# Patient Record
Sex: MALE | Race: WHITE | NOT HISPANIC OR LATINO | Employment: OTHER | URBAN - METROPOLITAN AREA
[De-identification: names, ages, dates, MRNs, and addresses within clinical notes are randomized per-mention and may not be internally consistent; named-entity substitution may affect disease eponyms.]

---

## 2017-08-10 ENCOUNTER — OFFICE VISIT (OUTPATIENT)
Dept: URGENT CARE | Facility: CLINIC | Age: 42
End: 2017-08-10
Payer: COMMERCIAL

## 2017-08-10 VITALS
BODY MASS INDEX: 27.09 KG/M2 | TEMPERATURE: 98 F | HEIGHT: 72 IN | DIASTOLIC BLOOD PRESSURE: 73 MMHG | SYSTOLIC BLOOD PRESSURE: 120 MMHG | WEIGHT: 200 LBS | HEART RATE: 73 BPM | OXYGEN SATURATION: 100 % | RESPIRATION RATE: 18 BRPM

## 2017-08-10 DIAGNOSIS — L03.811 ABSCESS OR CELLULITIS OF SCALP: Primary | ICD-10-CM

## 2017-08-10 PROCEDURE — 99214 OFFICE O/P EST MOD 30 MIN: CPT | Mod: S$GLB,,, | Performed by: FAMILY MEDICINE

## 2017-08-10 PROCEDURE — 3008F BODY MASS INDEX DOCD: CPT | Mod: S$GLB,,, | Performed by: FAMILY MEDICINE

## 2017-08-10 RX ORDER — SULFAMETHOXAZOLE AND TRIMETHOPRIM 400; 80 MG/1; MG/1
1 TABLET ORAL 2 TIMES DAILY
Qty: 20 TABLET | Refills: 0 | Status: SHIPPED | OUTPATIENT
Start: 2017-08-10 | End: 2017-08-20

## 2017-08-10 RX ORDER — MUPIROCIN 20 MG/G
OINTMENT TOPICAL
Qty: 22 G | Refills: 1 | Status: SHIPPED | OUTPATIENT
Start: 2017-08-10 | End: 2017-11-22

## 2017-08-10 NOTE — PATIENT INSTRUCTIONS
Cellulitis  Cellulitis is an infection of the deep layers of skin. A break in the skin, such as a cut or scratch, can let bacteria under the skin. If the bacteria get to deep layers of the skin, it can be serious. If not treated, cellulitis can get into the bloodstream and lymph nodes. The infection can then spread throughout the body. This causes serious illness.  Cellulitis causes the affected skin to become red, swollen, warm, and sore. The reddened areas have a visible border. An open sore may leak fluid (pus). You may have a fever, chills, and pain.  Cellulitis is treated with antibiotics taken for 7 to 10 days. An open sore may be cleaned and covered with cool wet gauze. Symptoms should get better 1 to 2 days after treatment is started. Make sure to take all the antibiotics for the full number of days until they are gone. Keep taking the medicine even if your symptoms go away.  Home care  Follow these tips:  · Limit the use of the part of your body with cellulitis.   · If the infection is on your leg, keep your leg raised while sitting. This will help to reduce swelling.  · Take all of the antibiotic medicine exactly as directed until it is gone. Do not miss any doses, especially during the first 7 days. Dont stop taking the medicine when your symptoms get better.  · Keep the affected area clean and dry.  · Wash your hands with soap and warm water before and after touching your skin. Anyone else who touches your skin should also wash his or her hands. Don't share towels.  Follow-up care  Follow up with your healthcare provider, or as advised. If your infection does not go away on the first antibiotic, your healthcare provider will prescribe a different one.  When to seek medical advice  Call your healthcare provider right away if any of these occur:  · Red areas that spread  · Swelling or pain that gets worse  · Fluid leaking from the skin (pus)  · Fever higher of 100.4º F (38.0º C) or higher after 2 days  on antibiotics  Date Last Reviewed: 9/1/2016  © 1771-7157 [employer]. [address] [place] [address], [place] [address]. All rights reserved. This information is not intended as a substitute for professional medical care. Always follow your healthcare professional's instructions.

## 2017-08-10 NOTE — PROGRESS NOTES
Subjective:       Patient ID: Tonio Adams is a 42 y.o. male.    Vitals:  height is 6' (1.829 m) and weight is 90.7 kg (200 lb). His temperature is 98.4 °F (36.9 °C). His blood pressure is 120/73 and his pulse is 73. His respiration is 18 and oxygen saturation is 100%.     Chief Complaint: Trauma    Patient reports pain in his occiput region - has noted for the past three days. No known history of trauma. No systemic symptoms.       Trauma   The incident occurred at home. The injury mechanism is unknown. The context of the injury is unknown. No protective equipment was used. Head/neck injury location: Top of head. The pain is moderate. It is unknown if a foreign body is present. Pertinent negatives include no abdominal pain, chest pain, headaches, nausea, neck pain, numbness, vomiting or weakness. There have been no prior injuries to these areas. His tetanus status is UTD.     Review of Systems   Constitution: Negative for chills, fever, weakness and malaise/fatigue.   HENT: Negative for headaches, nosebleeds and sore throat.    Eyes: Negative for blurred vision.   Cardiovascular: Negative for chest pain and syncope.   Respiratory: Negative for shortness of breath.    Skin: Negative for rash.   Musculoskeletal: Negative for back pain, joint pain and neck pain.   Gastrointestinal: Negative for abdominal pain, diarrhea, nausea and vomiting.   Genitourinary: Negative for hematuria.   Neurological: Negative for dizziness and numbness.   Psychiatric/Behavioral: The patient is not nervous/anxious.        Objective:      Physical Exam   Constitutional: He appears well-developed and well-nourished.   Cardiovascular: Normal rate, regular rhythm and normal heart sounds.    Pulmonary/Chest: Effort normal and breath sounds normal.   Abdominal: Soft.   Skin: There is erythema (erythematous, innudurated small papular lesion occiput region with surrounding erythema).   Vitals reviewed.      Assessment:       1. Abscess or  cellulitis of scalp        Plan:         Abscess or cellulitis of scalp  -     sulfamethoxazole-trimethoprim 400-80mg (BACTRIM) 400-80 mg per tablet; Take 1 tablet by mouth 2 (two) times daily.  Dispense: 20 tablet; Refill: 0  -     mupirocin (BACTROBAN) 2 % ointment; Apply to affected area 3 times daily  Dispense: 22 g; Refill: 1    discussed warm compresses with patient.

## 2017-11-12 ENCOUNTER — OFFICE VISIT (OUTPATIENT)
Dept: URGENT CARE | Facility: CLINIC | Age: 42
End: 2017-11-12
Payer: COMMERCIAL

## 2017-11-12 VITALS
HEART RATE: 75 BPM | BODY MASS INDEX: 27.09 KG/M2 | DIASTOLIC BLOOD PRESSURE: 74 MMHG | WEIGHT: 200 LBS | OXYGEN SATURATION: 98 % | RESPIRATION RATE: 18 BRPM | HEIGHT: 72 IN | TEMPERATURE: 97 F | SYSTOLIC BLOOD PRESSURE: 134 MMHG

## 2017-11-12 DIAGNOSIS — S62.339A CLOSED BOXER'S FRACTURE, INITIAL ENCOUNTER: Primary | ICD-10-CM

## 2017-11-12 PROCEDURE — 99214 OFFICE O/P EST MOD 30 MIN: CPT | Mod: S$GLB,,, | Performed by: FAMILY MEDICINE

## 2017-11-12 RX ORDER — IBUPROFEN 800 MG/1
800 TABLET ORAL EVERY 8 HOURS PRN
Qty: 30 TABLET | Refills: 1 | Status: SHIPPED | OUTPATIENT
Start: 2017-11-12 | End: 2017-11-22

## 2017-11-12 NOTE — PATIENT INSTRUCTIONS
Closed Hand Fracture (Adult)  You have a fracture, or broken bone, in your hand. This may be a small crack or chip in the bone. Or it may be a major break with the broken parts pushed out of place. A closed fracture means that the broken bone has not gone through the skin. A hand fracture is treated with a splint or cast. It usually takes 4 to 6 weeks to heal. Severe injuries may require surgery.     Home care  · Keep your arm elevated to reduce pain and swelling. When sitting or lying down, elevate your arm above the level of your heart. You can do this by placing your arm on a pillow that rests on your chest or on a pillow at your side. This is most important during the first 48 hours after injury.  · Apply an ice pack over the injured area for no more than 15 to 20 minutes. Do this every 1 to 2 hours for the first 24 to 48 hours. Continue with ice packs as needed to ease pain and swelling. To make an ice pack, put ice cubes in a plastic bag that seals at the top. Wrap the bag in a clean, thin towel or cloth. Never put ice or an ice pack directly on the skin. You can place the ice pack inside the sling and directly over the cast or splint. As the ice melts, be careful that the cast or splint doesnt get wet.  · Keep the cast or splint completely dry at all times. Bathe with your cast or splint out of the water, protected with 2 large plastic bags. Place 1 bag outside the other. Tape each bag with duct tape at the top end. If a fiberglass cast or splint gets wet, dry it with a hair dryer on a cool setting.  · You may use over-the-counter pain medicine to control pain, unless another pain medicine was prescribed. If you have chronic liver or kidney disease or ever had a stomach ulcer or GI bleeding, talk with your provider beforeusing these medicines.  Follow-up care  Follow up with your healthcare provider within 1 week, or as advised. This is to be sure the bone is healing properly. If you were given a splint,  it may be changed to a cast at your follow-up visit.  If X-rays were taken, you will be told of any new findings that may affect your care.  When to seek medical advice  Call your healthcare provider right away if any of these occur:  · The plaster cast or splint becomes wet or soft  · The fiberglass cast or splint stays wet for more than 24 hours  · The cast has a bad smell  · The plaster cast or splint becomes loose  · There is increased tightness or pain under the cast or splint  · The fingers on your injured hand become swollen, cold, blue, numb, or tingly  Date Last Reviewed: 12/3/2015  © 4422-5271 [employer]. [address] [place] [address], [place] [address]. All rights reserved. This information is not intended as a substitute for professional medical care. Always follow your healthcare professional's instructions.

## 2017-11-12 NOTE — PROGRESS NOTES
Subjective:       Patient ID: Tonio Adams is a 42 y.o. male.    Vitals:  height is 6' (1.829 m) and weight is 90.7 kg (200 lb). His oral temperature is 97.2 °F (36.2 °C). His blood pressure is 134/74 and his pulse is 75. His respiration is 18 and oxygen saturation is 98%.     Chief Complaint: Trauma (Right Hand Pain)    Patient present today with right hand pain since Friday night. Patient states he was boxing without gloves on when he injured his right hand. Patient states he was taking ibuprofen for the pain with little relief.      Trauma   The incident occurred 2 days ago. Incident location: [facility]. The injury mechanism was a direct blow. Context: Boxing. No protective equipment was used. There is an injury to the right hand (Right Hand). Finger injury location: Right Hand. The pain is mild. It is unlikely that a foreign body is present. Pertinent negatives include no abdominal pain, chest pain, neck pain, numbness or weakness. There have been no prior injuries to these areas. His tetanus status is UTD.     Review of Systems   Constitution: Negative for weakness and malaise/fatigue.   HENT: Negative for nosebleeds.    Cardiovascular: Negative for chest pain and syncope.   Respiratory: Negative for shortness of breath.    Musculoskeletal: Positive for joint pain. Negative for back pain and neck pain.   Gastrointestinal: Negative for abdominal pain.   Genitourinary: Negative for hematuria.   Neurological: Negative for dizziness and numbness.       Objective:      Physical Exam   Constitutional: He appears well-developed and well-nourished.   Musculoskeletal: He exhibits tenderness (distal right fifth metacarpal with minimal swelling noted).   Nursing note reviewed.      Assessment:       1. Closed boxer's fracture, initial encounter        Plan:         Closed boxer's fracture, initial encounter  -     Cancel: X-Ray Hand 2 View Right; Future; Expected date: 11/12/2017  -     Ambulatory referral to  Orthopedics  -     ibuprofen (ADVIL,MOTRIN) 800 MG tablet; Take 1 tablet (800 mg total) by mouth every 8 (eight) hours as needed.  Dispense: 30 tablet; Refill: 1    Given information on purchase of ulnar gutter splint

## 2017-11-22 ENCOUNTER — OFFICE VISIT (OUTPATIENT)
Dept: ORTHOPEDICS | Facility: CLINIC | Age: 42
End: 2017-11-22
Payer: COMMERCIAL

## 2017-11-22 ENCOUNTER — TELEPHONE (OUTPATIENT)
Dept: ORTHOPEDICS | Facility: CLINIC | Age: 42
End: 2017-11-22

## 2017-11-22 ENCOUNTER — HOSPITAL ENCOUNTER (OUTPATIENT)
Dept: RADIOLOGY | Facility: OTHER | Age: 42
Discharge: HOME | End: 2017-11-22
Attending: PHYSICIAN ASSISTANT
Payer: COMMERCIAL

## 2017-11-22 VITALS
WEIGHT: 200 LBS | HEART RATE: 64 BPM | RESPIRATION RATE: 18 BRPM | SYSTOLIC BLOOD PRESSURE: 138 MMHG | DIASTOLIC BLOOD PRESSURE: 87 MMHG | BODY MASS INDEX: 27.09 KG/M2 | HEIGHT: 72 IN

## 2017-11-22 DIAGNOSIS — S62.339A CLOSED BOXER'S FRACTURE, INITIAL ENCOUNTER: Primary | ICD-10-CM

## 2017-11-22 DIAGNOSIS — M79.641 RIGHT HAND PAIN: ICD-10-CM

## 2017-11-22 DIAGNOSIS — M79.641 RIGHT HAND PAIN: Primary | ICD-10-CM

## 2017-11-22 PROCEDURE — 73130 X-RAY EXAM OF HAND: CPT | Mod: TC,RT

## 2017-11-22 PROCEDURE — 99213 OFFICE O/P EST LOW 20 MIN: CPT | Mod: S$GLB,,, | Performed by: PHYSICIAN ASSISTANT

## 2017-11-22 PROCEDURE — 99999 PR PBB SHADOW E&M-EST. PATIENT-LVL III: CPT | Mod: PBBFAC,,, | Performed by: PHYSICIAN ASSISTANT

## 2017-11-22 PROCEDURE — 73130 X-RAY EXAM OF HAND: CPT | Mod: 26,RT,, | Performed by: RADIOLOGY
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
## 2017-11-22 NOTE — TELEPHONE ENCOUNTER
----- Message from Zelalem Kaur sent at 11/22/2017 10:51 AM CST -----  Contact: patient  FST Request    x_ 1st Request   _ 2nd Request   _ 3rd Request     Who: STEPHEN SEYMOUR [[id number]]    Why: Patient called is requesting to be seen today for right hand fracture.  Patient was seen in  on 11/12/17 see chart notes.    What Number to Call Back: [phone]    When to Expect a call back: (Before the end of the day)   -- if call after 3:00 call back will be tomorrow.

## 2017-12-06 ENCOUNTER — HOSPITAL ENCOUNTER (OUTPATIENT)
Dept: RADIOLOGY | Facility: OTHER | Age: 42
Discharge: HOME | End: 2017-12-06
Attending: PHYSICIAN ASSISTANT
Payer: COMMERCIAL

## 2017-12-06 ENCOUNTER — OFFICE VISIT (OUTPATIENT)
Dept: ORTHOPEDICS | Facility: CLINIC | Age: 42
End: 2017-12-06
Payer: COMMERCIAL

## 2017-12-06 VITALS
SYSTOLIC BLOOD PRESSURE: 135 MMHG | WEIGHT: 200 LBS | DIASTOLIC BLOOD PRESSURE: 87 MMHG | HEIGHT: 72 IN | BODY MASS INDEX: 27.09 KG/M2 | HEART RATE: 83 BPM | RESPIRATION RATE: 18 BRPM

## 2017-12-06 DIAGNOSIS — S62.339A CLOSED BOXER'S FRACTURE, INITIAL ENCOUNTER: Primary | ICD-10-CM

## 2017-12-06 DIAGNOSIS — S62.339A CLOSED BOXER'S FRACTURE, INITIAL ENCOUNTER: ICD-10-CM

## 2017-12-06 PROCEDURE — 73130 X-RAY EXAM OF HAND: CPT | Mod: TC,RT

## 2017-12-06 PROCEDURE — 99999 PR PBB SHADOW E&M-EST. PATIENT-LVL IV: CPT | Mod: PBBFAC,,, | Performed by: PHYSICIAN ASSISTANT

## 2017-12-06 PROCEDURE — 99213 OFFICE O/P EST LOW 20 MIN: CPT | Mod: S$GLB,,, | Performed by: PHYSICIAN ASSISTANT

## 2017-12-06 PROCEDURE — 73130 X-RAY EXAM OF HAND: CPT | Mod: 26,RT,, | Performed by: RADIOLOGY
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
## 2017-12-14 ENCOUNTER — CLINICAL SUPPORT (OUTPATIENT)
Dept: REHABILITATION | Facility: HOSPITAL | Age: 42
End: 2017-12-14
Attending: PHYSICIAN ASSISTANT
Payer: COMMERCIAL

## 2017-12-14 DIAGNOSIS — S62.339A CLOSED BOXER'S FRACTURE, INITIAL ENCOUNTER: Primary | Chronic | ICD-10-CM

## 2017-12-14 PROCEDURE — 97018 PARAFFIN BATH THERAPY: CPT

## 2017-12-14 PROCEDURE — 97110 THERAPEUTIC EXERCISES: CPT

## 2017-12-14 PROCEDURE — 97165 OT EVAL LOW COMPLEX 30 MIN: CPT
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
## 2017-12-14 NOTE — PATIENT INSTRUCTIONS
[facility]  OCCUPATIONAL THERAPY  HOME EXERCISE PROGRAM     Complete the following massage for 3 minutes, 3x/day:           A. Enclose tip of finger with other hand and slide toward wrist.  B. For larger areas, massage toward the body in one direction only.  .  Complete the following exercises for 10 repetitions, 3x/day:                                                                   ASHLEY Sheehan LOTR, MARCYT  Certified Hand Therapist  Occupational Therapist

## 2017-12-20 NOTE — PROGRESS NOTES
"Occupational Therapy Daily Note     Date: 12/21/2017  Name: Tonio Adams  YOB: 1975  Chart Number: [id number]  Referring Physician: Eneida Tafoya PA and Dr. Corbett  Diagnosis:   1. Closed boxer's fracture, initial encounter       ICD 10: S62.339A  Involved Side: Right   Hand Dominance: Right  Date of Onset: 11/10/2017  Date of OT Evaluation: 12/14/2017    Visit #: 2    Subjective   "I have been doing my home program and my motion is a lot better."  Pain Report (severity and location)  Current: 0 out of 10  With activity: 1 out of 10     Objective   Edema. Measured in centimeters.    12/14/2017 12/14/2017       Ring:             P1            6.8 7.0        PIP            6.2 7.1       P2             5.4 5.8       Small:              P1           5.7 6.5         PIP        5.4 5.8       P2        5.0 5.0          Wrist ROM. Measured in degrees.  Date 12/14/2017 12/14/2017         Left Right       Wrist ext/flex 73/77 53/48            Hand ROM. Measured in degrees.  Date 12/14/2017 12/14/2017         Left Right       Ring:   MP 0/87 0/42                  PIP 0/108 8/48                  DIP 0/78 0/15                  FRY   97                   Small:  MP 0/95 0/14                   PIP 0/94 10/30                   DIP 0/80 0/10                  FRY   44              Strength (Dyanmometer) and Pinch Strength (Pinch Gauge)  Measured in pounds.  Date 12/14/2017 12/14/2017         Left Right         deferred deferred       Rung II           Rung III           Ulrich Pinch           3pt Pinch           2pt Pinch                 Functional Limitation Reporting   Tool: FOTO  Category: Carry, Move, Handle  Visit DATE SCORE Current  G-Code Goal  G-Code   Intake 12/14/2017 57/100 -JX -DM   5TH           10TH           Discharge              Treatment      IPJ Blocks FDS Glides Wave Hook Straight Fist Composite Fist   RF x10  SF x10  x10 AROM x10 AROM x10 AROM x10 AROM     Lifts ADD/ABD " "Reverse  Blocks Isolated   EDM Isolated   EDC   x10 AROM x10 AROM Not today x10 AROM Not today     Isospheres Octy Pom Poms In Hand Manip Dowel   Grabs    x3min Not today SF scoops x 1 container Not today Lg/md/sm x20ea      Reviewed HEP with patient to ensure proper performance of therex.     Charges   Start Time: 2:15PM  End Time: 3:00PM  Total Time: 45min  Moist Hot Pack - 55930    Fluidotherapy - 75788    Ultrasound - 10113    Paraffin - 97018 X1, 15min    Therapeutic Exercise - 97110 X2, 30min   Therapeutic Activity - 96723    Manual Therapy - 53725      Assessment   Patient is a 41yo male with a right boxers fracture being managed conservatively. Edema, decreased endurance, limited ROM, and decreased strength are limiting functional use of R, dominant hand.    Plan   Continue skilled occupational therapy 1-2x/week for 12 weeks from 12/14/2017 to 3/14/2017.    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  LTG #1: Pt will report a pain level of 0 out of 10 with using tools.       LTG #2: Pt will demo improved FOTO score to 80% or higher.    LTG #3: Pt will return to prior level of function for ADLs and household management.      Short Term Goals (STGs); to be met within 4 weeks (1/14/2018).  STG #1a: Pt will report 2 out of 10 pain level with bathing.  STG #2a: Pt will demo improved FOTO score by 15 points.   STG #3a: Pt will demonstrate independence with issued HEP. MET  STG #3b: Pt will demo improved RF and SF FRY by 20 degrees each needed to aid with closing his hand around objects.    Adjustments/Upgrades to HEP None today     Therapist: Eneida Price, ASHLEY, BLESSING, CHT     "

## 2017-12-21 ENCOUNTER — CLINICAL SUPPORT (OUTPATIENT)
Dept: REHABILITATION | Facility: HOSPITAL | Age: 42
End: 2017-12-21
Payer: COMMERCIAL

## 2017-12-21 DIAGNOSIS — S62.339A CLOSED BOXER'S FRACTURE, INITIAL ENCOUNTER: Primary | Chronic | ICD-10-CM

## 2017-12-21 PROCEDURE — 97110 THERAPEUTIC EXERCISES: CPT

## 2017-12-21 PROCEDURE — 97018 PARAFFIN BATH THERAPY: CPT

## 2017-12-27 NOTE — PROGRESS NOTES
Occupational Therapy Daily Note     Date: 12/28/2017  Name: Tonio Adams  YOB: 1975  Chart Number: [id number]  Referring Physician: Eneida Tafoya PA and Dr. Corbett  Diagnosis:   1. Closed boxer's fracture, initial encounter       ICD 10: S62.339A  Involved Side: Right   Hand Dominance: Right  Date of Onset: 11/10/2017  Date of OT Evaluation: 12/14/2017    Visit #: 3    Subjective   Pt reported continued compliance with HEP.     Objective   Edema. Measured in centimeters.    12/14/2017 12/14/2017       Ring:             P1            6.8 7.0        PIP            6.2 7.1       P2             5.4 5.8       Small:              P1           5.7 6.5         PIP        5.4 5.8       P2        5.0 5.0          Wrist ROM. Measured in degrees.  Date 12/14/2017 12/14/2017        Left Right      Wrist ext/flex 73/77 53/48            Hand ROM. Measured in degrees.  Date 12/14/2017 12/14/2017        Left Right      Ring:   MP 0/87 0/42                  PIP 0/108 8/48                  DIP 0/78 0/15                  FRY   97                   Small:  MP 0/95 0/14                   PIP 0/94 10/30                   DIP 0/80 0/10                  FYR   44              Strength (Dyanmometer) and Pinch Strength (Pinch Gauge)  Measured in pounds.  Date 12/14/2017 12/14/2017         Left Right         deferred deferred       Rung II           Rung III           Ulrich Pinch           3pt Pinch           2pt Pinch                 Functional Limitation Reporting   Tool: FOTO  Category: Carry, Move, Handle  Visit DATE SCORE Current  G-Code Goal  G-Code   Intake 12/14/2017 57/100 -ZE -AS   5TH           10TH           Discharge              Treatment      IPJ Blocks FDS Glides Wave Hook Straight Fist Composite Fist   RF x10  SF x10  x10 AROM x10 AROM x10 AROM x10 AROM     Lifts ADD/ABD Reverse  Blocks Isolated   EDM Isolated   EDC   x10 AROM x10 AROM Not today x10 AROM Not today     Isospheres Octy Pom Poms In  "Hand Manip Dowel   Grabs Rice  Transfers   x3min Not today SF scoops x 1 container Not today Lg/md/sm x20ea x3min     Reviewed HEP with patient to ensure proper performance of therex.     Charges   Start Time: 2:30  End Time: 3:15  Total Time: 45min  Moist Hot Pack - 73063    Fluidotherapy - 42851    Ultrasound - 03748    Paraffin - 97018 X1, 15min    Therapeutic Exercise - 97110 X2, 30min   Therapeutic Activity - 34737    Manual Therapy - 33555      Assessment   Patient is a 43yo male with a right boxers fracture being managed conservatively. Edema, decreased endurance, limited ROM, and decreased strength are limiting functional use of R, dominant hand. Pt to return to Md.     Plan   Continue skilled occupational therapy 1-2x/week for 12 weeks from 12/14/2017 to 3/14/2017.    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  LTG #1: Pt will report a pain level of 0 out of 10 with using tools.       LTG #2: Pt will demo improved FOTO score to 80% or higher.    LTG #3: Pt will return to prior level of function for ADLs and household management.      Short Term Goals (STGs); to be met within 4 weeks (1/14/2018).  STG #1a: Pt will report 2 out of 10 pain level with bathing.  STG #2a: Pt will demo improved FOTO score by 15 points.   STG #3a: Pt will demonstrate independence with issued HEP. MET  STG #3b: Pt will demo improved RF and SF FRY by 20 degrees each needed to aid with closing his hand around objects.    Adjustments/Upgrades to HEP None today     Therapist: Eneida Price, ASHLEY, BLESSING, CHT     "

## 2017-12-28 ENCOUNTER — CLINICAL SUPPORT (OUTPATIENT)
Dept: REHABILITATION | Facility: HOSPITAL | Age: 42
End: 2017-12-28
Payer: COMMERCIAL

## 2017-12-28 DIAGNOSIS — S62.339A CLOSED BOXER'S FRACTURE, INITIAL ENCOUNTER: Primary | Chronic | ICD-10-CM

## 2017-12-28 PROCEDURE — 97018 PARAFFIN BATH THERAPY: CPT

## 2017-12-28 PROCEDURE — 97110 THERAPEUTIC EXERCISES: CPT

## 2018-01-03 ENCOUNTER — OFFICE VISIT (OUTPATIENT)
Dept: ORTHOPEDICS | Facility: CLINIC | Age: 43
End: 2018-01-03
Payer: COMMERCIAL

## 2018-01-03 ENCOUNTER — HOSPITAL ENCOUNTER (OUTPATIENT)
Dept: RADIOLOGY | Facility: OTHER | Age: 43
Discharge: HOME | End: 2018-01-03
Attending: PHYSICIAN ASSISTANT
Payer: COMMERCIAL

## 2018-01-03 VITALS
HEIGHT: 72 IN | WEIGHT: 200 LBS | HEART RATE: 80 BPM | BODY MASS INDEX: 27.09 KG/M2 | DIASTOLIC BLOOD PRESSURE: 92 MMHG | SYSTOLIC BLOOD PRESSURE: 149 MMHG | RESPIRATION RATE: 18 BRPM

## 2018-01-03 DIAGNOSIS — S62.339A CLOSED BOXER'S FRACTURE, INITIAL ENCOUNTER: Primary | Chronic | ICD-10-CM

## 2018-01-03 DIAGNOSIS — S62.339A CLOSED BOXER'S FRACTURE, INITIAL ENCOUNTER: ICD-10-CM

## 2018-01-03 PROCEDURE — 73130 X-RAY EXAM OF HAND: CPT | Mod: 26,FY,RT, | Performed by: RADIOLOGY

## 2018-01-03 PROCEDURE — 73130 X-RAY EXAM OF HAND: CPT | Mod: TC,FY,RT

## 2018-01-03 PROCEDURE — 99999 PR PBB SHADOW E&M-EST. PATIENT-LVL III: CPT | Mod: PBBFAC,,, | Performed by: PHYSICIAN ASSISTANT

## 2018-01-03 PROCEDURE — 99213 OFFICE O/P EST LOW 20 MIN: CPT | Mod: S$GLB,,, | Performed by: PHYSICIAN ASSISTANT

## 2018-01-03 NOTE — PROGRESS NOTES
Subjective:      Patient ID: Tonio Adams is a 42 y.o. male.    Chief Complaint: Pain of the Right Hand      HPI  Tonio Adams is a right hand dominant 42 y.o. male presenting today for follow up of right 5th metacarpal fracture.  There was a history of trauma.  Onset of symptoms began 8 weeks ago, approximately 11/10/17.  He has been in a TKO brace for the past 8 weeks.  He has started OT and is attending once a week.  He says that the pain has improved.  He reports that he has started some light lifting with the right hand in his daily activities.  He reports improving motion.  He denies any numbness or tingling.      Review of patient's allergies indicates:  No Known Allergies      No current outpatient prescriptions on file.     No current facility-administered medications for this visit.        History reviewed. No pertinent past medical history.    History reviewed. No pertinent surgical history.      Review of Systems:  Review of Systems   Constitution: Negative for chills and fever.   Skin: Negative for rash and suspicious lesions.   Musculoskeletal:        See HPI   Neurological: Negative for dizziness, headaches, light-headedness, numbness and paresthesias.   Psychiatric/Behavioral: Negative for depression. The patient is not nervous/anxious.          OBJECTIVE:     PHYSICAL EXAM:  Height: 6' (182.9 cm) Weight: 90.7 kg (200 lb)     Vitals:    01/03/18 1411   BP: (!) 149/92   Pulse: 80   Resp: 18     General    Vitals reviewed.  Constitutional: He is oriented to person, place, and time. He appears well-developed and well-nourished.   HENT:   Head: Normocephalic and atraumatic.   Neck: Normal range of motion.   Cardiovascular: Normal rate.    Pulmonary/Chest: Effort normal. No respiratory distress.   Neurological: He is alert and oriented to person, place, and time.   Psychiatric: He has a normal mood and affect. His behavior is normal. Judgment and thought content normal.             Musculoskeletal: Mild  edema noted over the ulnar aspect of the right hand including the right small and ring fingers - improving.  No ecchymosis, no cuts or abrasions appreciated.  Decreased motion of the small and ring finger noted on the right. No significant scissoring of the small finger noted, appears symmetrical.  Non-tender to palpation over the fifth metacarpal. Neurovascularly intact - good sensation and motor function, good capillary refill.     RADIOGRAPHS:  Right Hand X-Ray, 12/6/17  FINDINGS:     Fracture: Fracture of the distal 5th metacarpal metadiaphysis with minimal dorsal angulation is in unchanged alignment.  There is associated periosteal new bone formation.   Joint Spaces: Normal.   Soft Tissues: Normal.   Other: N/A   Impression   Healing fracture of the distal 5th metacarpal metadiaphysis with minimal dorsal angulation in unchanged alignment.     Comments: I have personally reviewed the imaging and I agree with the above radiologist's report.    ASSESSMENT/PLAN:   Tonio was seen today for pain.    Diagnoses and all orders for this visit:    Closed boxer's fracture, initial encounter  -     X-Ray Hand 3 View Right; Future           - We talked at length about the anatomy and pathophysiology of   Encounter Diagnosis   Name Primary?    Closed boxer's fracture, initial encounter Yes       - Continue regular OT  - X-Ray reviewed with patient  - Discussed lifting restrictions  - Continue use of TKO brace, wean out in OT  - Follow up in 4 weeks with X-Ray

## 2018-01-04 ENCOUNTER — CLINICAL SUPPORT (OUTPATIENT)
Dept: REHABILITATION | Facility: HOSPITAL | Age: 43
End: 2018-01-04
Payer: COMMERCIAL

## 2018-01-04 PROCEDURE — 97140 MANUAL THERAPY 1/> REGIONS: CPT

## 2018-01-04 PROCEDURE — 97110 THERAPEUTIC EXERCISES: CPT

## 2018-01-04 NOTE — PROGRESS NOTES
"Occupational Therapy Daily Note     Date: 1/4/2018  Name: Tonio Adams  YOB: 1975  Chart Number: [id number]  Referring Physician: Eneida Tafoya PA and Dr. Corbett  Diagnosis:   No diagnosis found.  ICD 10: S62.339A  Involved Side: Right   Hand Dominance: Right  Date of Onset: 11/10/2017  Date of OT Evaluation: 12/14/2017    Visit #: 4    Subjective   "I am ready to get rid of this brace, the doctor said I can come out of it"    Objective   Edema. Measured in centimeters.    12/14/2017 12/14/2017       Ring:             P1            6.8 7.0        PIP            6.2 7.1       P2             5.4 5.8       Small:              P1           5.7 6.5         PIP        5.4 5.8       P2        5.0 5.0          Wrist ROM. Measured in degrees.  Date 12/14/2017 12/14/2017        Left Right      Wrist ext/flex 73/77 53/48            Hand ROM. Measured in degrees.  Date 12/14/2017 12/14/2017        Left Right      Ring:   MP 0/87 0/42                  PIP 0/108 8/48                  DIP 0/78 0/15                  FRY   97                   Small:  MP 0/95 0/14                   PIP 0/94 10/30                   DIP 0/80 0/10                  FRY   44              Strength (Dyanmometer) and Pinch Strength (Pinch Gauge)  Measured in pounds.  Date 12/14/2017 12/14/2017         Left Right         deferred deferred       Rung II           Rung III           Ulrich Pinch           3pt Pinch           2pt Pinch                 Functional Limitation Reporting   Tool: FOTO  Category: Carry, Move, Handle  Visit DATE SCORE Current  G-Code Goal  G-Code   Intake 12/14/2017 57/100 -TC -YG   5TH           10TH           Discharge              Treatment        Retrograde massage  Gentle PROM stretching to pt gerald.   Rubber band finger walking  Gripper white spring #1,2,3 to  cotton balls.  Putty gripping soft yellow to pt gerald.    Pt instructed in use of ice and pain management strategies for possible muscular " "soreness    IPJ Blocks FDS Glides Wave Hook Straight Fist Composite Fist   RF x10  SF x10  x10 AROM x10 AROM x10 AROM x10 AROM     Lifts ADD/ABD Reverse  Blocks Isolated   EDM Isolated   EDC   x10 AROM x10 AROM Not today x10 AROM Not today     Isospheres Octy Pom Poms In Hand Manip Dowel   Grabs Rice  Transfers   x3min Not today SF scoops x 1 container Not today Lg/md/sm x20ea x3min     Reviewed HEP with patient to ensure proper performance of therex.     Charges   Start Time: 2:30  End Time: 3:15  Total Time: 45min  Moist Hot Pack - 20118    Fluidotherapy - 80007    Ultrasound - 29866    Paraffin - 97018 X1, 15min    Therapeutic Exercise - 97110 X2, 30min   Therapeutic Activity - 17645    Manual Therapy - 57352      Assessment   Patient is a 43yo male with a right boxers fracture being managed conservatively. Edema, decreased endurance, limited ROM, and decreased strength are limiting functional use of R, dominant hand. Pt to return to Md. Pt able to  18 lbs with gripper pain free. Digital AROM shows impressive AROM. Pt has little pain. The patient presents with mild edema in digits. Provided coban at end of therapy to be removed in one hour.    Plan   Continue skilled occupational therapy 1-2x/week for 12 weeks from 12/14/2017 to 3/14/2017.    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  LTG #1: Pt will report a pain level of 0 out of 10 with using tools.       LTG #2: Pt will demo improved FOTO score to 80% or higher.    LTG #3: Pt will return to prior level of function for ADLs and household management.      Short Term Goals (STGs); to be met within 4 weeks (1/14/2018).  STG #1a: Pt will report 2 out of 10 pain level with bathing.  STG #2a: Pt will demo improved FOTO score by 15 points.   STG #3a: Pt will demonstrate independence with issued HEP. MET  STG #3b: Pt will demo improved RF and SF FRY by 20 degrees " each needed to aid with closing his hand around objects.    Adjustments/Upgrades to HEP None today     Therapist: ASHLEY Sheehan LOTR, CHT

## 2018-01-09 ENCOUNTER — CLINICAL SUPPORT (OUTPATIENT)
Dept: REHABILITATION | Facility: HOSPITAL | Age: 43
End: 2018-01-09
Attending: PHYSICIAN ASSISTANT
Payer: COMMERCIAL

## 2018-01-09 PROCEDURE — 97110 THERAPEUTIC EXERCISES: CPT

## 2018-01-09 PROCEDURE — 97140 MANUAL THERAPY 1/> REGIONS: CPT

## 2018-01-09 NOTE — PROGRESS NOTES
"Occupational Therapy Daily Note     Date: 1/9/2018  Name: Tonio Adams  YOB: 1975  Chart Number: [id number]  Referring Physician: Eneida Tafoya PA and Dr. Corbett  Diagnosis:   No diagnosis found.   ICD 10: S62.339A  Involved Side: Right   Hand Dominance: Right  Date of Onset: 11/10/2017  Date of OT Evaluation: 12/14/2017    Visit #: 5    Subjective   "it was sore but not terrible"    Objective   Edema. Measured in centimeters.    12/14/2017 12/14/2017       Ring:             P1            6.8 7.0        PIP            6.2 7.1       P2             5.4 5.8       Small:              P1           5.7 6.5         PIP        5.4 5.8       P2        5.0 5.0          Wrist ROM. Measured in degrees.  Date 12/14/2017 12/14/2017        Left Right      Wrist ext/flex 73/77 53/48            Hand ROM. Measured in degrees.  Date 12/14/2017 12/14/2017        Left Right      Ring:   MP 0/87 0/42                  PIP 0/108 8/48                  DIP 0/78 0/15                  FRY   97                   Small:  MP 0/95 0/14                   PIP 0/94 10/30                   DIP 0/80 0/10                  FRY   44              Strength (Dyanmometer) and Pinch Strength (Pinch Gauge)  Measured in pounds.  Date 12/14/2017 12/14/2017         Left Right         deferred deferred       Rung II           Rung III           Ulrich Pinch           3pt Pinch           2pt Pinch                 Functional Limitation Reporting   Tool: FOTO  Category: Carry, Move, Handle  Visit DATE SCORE Current  G-Code Goal  G-Code   Intake 12/14/2017 57/100 -WT -HI   5TH           10TH           Discharge              Treatment        Retrograde massage  Gentle PROM stretching to pt gerald.   Rubber band finger walking  Gripper white spring #1,2,3 to  cotton balls.  Putty gripping soft yellow to pt gerald.    Pt instructed in use of ice and pain management strategies for possible muscular soreness    IPJ Blocks FDS Glides Wave Hook " "Straight Fist Composite Fist   RF x10  SF x10  x10 AROM x10 AROM x10 AROM x10 AROM     Lifts ADD/ABD Reverse  Blocks Isolated   EDM Isolated   EDC   x10 AROM x10 AROM Not today x10 AROM Not today     Isospheres Octy Pom Poms In Hand Manip Dowel   Grabs Rice  Transfers   x3min Not today SF scoops x 1 container Not today Lg/md/sm x20ea x3min     Reviewed HEP with patient to ensure proper performance of therex.     Charges   Start Time: 1:40  End Time: 2:18  Total Time: 45min  Moist Hot Pack - 21900    Fluidotherapy - 06240    Ultrasound - 85344    Paraffin - 46027    Therapeutic Exercise - 97110 X2, 30min   Therapeutic Activity - 73479    Manual Therapy - 97140 x1 8min     Assessment   Patient is a 41yo male with a right boxers fracture being managed conservatively. Edema, decreased endurance, limited ROM, and decreased strength are limiting functional use of R, dominant hand. Pt has little pain. The patient presents with mild edema in digits. Provided coban at end of therapy to be removed in one hour. Pt reported mild muscular soreness following previous session. Pt transitioning well to strengthening program. Pt provided with yellow t-putty for indep strengthening.     Plan   Continue skilled occupational therapy 1-2x/week for 12 weeks from 12/14/2017 to 3/14/2017.    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  LTG #1: Pt will report a pain level of 0 out of 10 with using tools.       LTG #2: Pt will demo improved FOTO score to 80% or higher.    LTG #3: Pt will return to prior level of function for ADLs and household management.      Short Term Goals (STGs); to be met within 4 weeks (1/14/2018).  STG #1a: Pt will report 2 out of 10 pain level with bathing.  STG #2a: Pt will demo improved FOTO score by 15 points.   STG #3a: Pt will demonstrate independence with issued HEP. MET  STG #3b: Pt will demo improved RF and SF " FRY by 20 degrees each needed to aid with closing his hand around objects.    Adjustments/Upgrades to HEP None today     Therapist: ASHLEY Sheehan LOTR, CHT

## 2018-01-11 ENCOUNTER — CLINICAL SUPPORT (OUTPATIENT)
Dept: REHABILITATION | Facility: HOSPITAL | Age: 43
End: 2018-01-11
Attending: PHYSICIAN ASSISTANT
Payer: COMMERCIAL

## 2018-01-11 DIAGNOSIS — S62.339A CLOSED BOXER'S FRACTURE, INITIAL ENCOUNTER: Primary | ICD-10-CM

## 2018-01-11 DIAGNOSIS — R29.898 DECREASED GRIP STRENGTH OF RIGHT HAND: ICD-10-CM

## 2018-01-11 DIAGNOSIS — M25.60 RANGE OF MOTION DEFICIT: ICD-10-CM

## 2018-01-11 PROCEDURE — 97018 PARAFFIN BATH THERAPY: CPT | Mod: 59

## 2018-01-11 PROCEDURE — 97110 THERAPEUTIC EXERCISES: CPT

## 2018-01-11 NOTE — PROGRESS NOTES
"Occupational Therapy Daily Note     Date: 1/11/2018  Name: Tonio Adams  YOB: 1975  Chart Number: [id number]  Referring Physician: Eneida Tafoya PA and Dr. Corbett  Diagnosis:   1. Closed boxer's fracture, initial encounter     2. Range of motion deficit     3. Decreased  strength of right hand        ICD 10: S62.339A  Involved Side: Right   Hand Dominance: Right  Date of Onset: 11/10/2017  Date of OT Evaluation: 12/14/2017    Visit #: 6  (FOTO, visit 1, 6)     Subjective   "I still have some stiffness but I'm doing some wall pushups and 4# wrist curls "     MD visit 2/1/2018     Objective   Edema. Measured in centimeters.    12/14/2017 12/14/2017 1/11/2018      Ring:    left  Right  Right      P1            6.8 7.0 6.6 (-0.4)      PIP            6.2 7.1  6.4 (-0.7)      P2             5.4 5.8  5.4 (-0.4)      Small:              P1           5.7 6.5 6.2 (-0.3 )        PIP        5.4 5.8  5.8      P2        5.0 5.0 5.0         Wrist ROM. Measured in degrees.  Date 12/14/2017 12/14/2017        Left Right      Wrist ext/flex 73/77 53/48            Hand ROM. Measured in degrees.  Date 12/14/2017 12/14/2017 1/11/18       Left Right Right      Ring:   MP 0/87 0/42 0/85                 PIP 0/108 8/48 0/97                 DIP 0/78 0/15  0/70                FRY   97  252                 Small:  MP 0/95 0/14 0/90                  PIP 0/94 10/30 8/93                 DIP 0/80 0/10  0/62                FRY   44  237            Strength (Dyanmometer) and Pinch Strength (Pinch Gauge)  Measured in pounds.  Date 1/11/2018          Right / left                   Rung II  R 45  / L 65          Rung III           Ulrich Pinch           3pt Pinch           2pt Pinch                 Functional Limitation Reporting   Tool: FOTO  Category: Carry, Move, Handle  Visit DATE SCORE Current  G-Code Goal  G-Code   Intake 12/14/2017 57/100 -SV -KC   6TH  1/11/2018  61/100       10TH           Discharge          " "    Treatment        Gentle PROM stretching to pt gerald.     Mixed putty yellow/red given to patient for HEP at last session.  Patient performed  daily at home.     IPJ Blocks FDS Glides Wave Hook Straight Fist Composite Fist   RF x10  SF x10  x10 AROM x10 AROM x10 AROM x10 AROM     Lifts ADD/ABD Reverse  Blocks Isolated   EDM Isolated   EDC   x10 AROM x10 AROM Not today x10 AROM X 10 reps      Added 55 lbs. PHG for gross  x 10 reps, blue digit extender for EDC/EDM strengthening x 15 reps. And green digi flex x 12 reps for isolated/composite finger flexion , 4# wrist flex/ext, RD, UD, sup/pron 2x10 reps.   Reviewed HEP with patient to ensure proper performance of therex.     Charges   Start Time: 2:15   End Time: 3  Total Time: 45min  Moist Hot Pack - 40461    Fluidotherapy - 34278    Ultrasound - 36144    Paraffin - 50829     10 min    Therapeutic Exercise - 97110 X2, 35min   Therapeutic Activity - 55991    Manual Therapy - 60061      Assessment   Patient is a 41yo male with a right boxers fracture being managed conservatively. Edema, decreased endurance, limited ROM, and decreased strength are limiting functional use of R, dominant hand. Pt has little pain. Pt transitioning well to strengthening program.     Plan   Continue skilled occupational therapy 1-2x/week for 12 weeks from 12/14/2017 to 3/14/2017.    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  LTG #1: Pt will report a pain level of 0 out of 10 with using tools.       LTG #2: Pt will demo improved FOTO score to 80% or higher.    LTG #3: Pt will return to prior level of function for ADLs and household management.      Short Term Goals (STGs); to be met within 4 weeks (1/14/2018).  STG #1a: Pt will report 2 out of 10 pain level with bathing.--met   STG #2a: Pt will demo improved FOTO score by 15 points.   STG #3a: Pt will demonstrate independence with issued " HEP. MET  STG #3b: Pt will demo improved RF and SF FRY by 20 degrees each needed to aid with closing his hand around objects.    Adjustments/Upgrades to HEP None today

## 2018-01-16 ENCOUNTER — CLINICAL SUPPORT (OUTPATIENT)
Dept: REHABILITATION | Facility: HOSPITAL | Age: 43
End: 2018-01-16
Attending: PHYSICIAN ASSISTANT
Payer: COMMERCIAL

## 2018-01-16 DIAGNOSIS — M25.60 RANGE OF MOTION DEFICIT: ICD-10-CM

## 2018-01-16 DIAGNOSIS — S62.339A CLOSED BOXER'S FRACTURE, INITIAL ENCOUNTER: Primary | ICD-10-CM

## 2018-01-16 DIAGNOSIS — R29.898 DECREASED GRIP STRENGTH OF RIGHT HAND: ICD-10-CM

## 2018-01-16 PROCEDURE — 97018 PARAFFIN BATH THERAPY: CPT | Mod: 59

## 2018-01-16 PROCEDURE — 97110 THERAPEUTIC EXERCISES: CPT

## 2018-01-16 NOTE — PROGRESS NOTES
"Occupational Therapy Daily Note     Date: 1/16/2018  Name: Tonio Adams  YOB: 1975  Chart Number: [id number]  Referring Physician: Eneida Tafoya PA and Dr. Corbett  Diagnosis:   1. Closed boxer's fracture, initial encounter     2. Range of motion deficit     3. Decreased  strength of right hand        ICD 10: S62.339A  Involved Side: Right   Hand Dominance: Right  Date of Onset: 11/10/2017  Date of OT Evaluation: 12/14/2017    Visit #: 7  (FOTO, visit 1, 6)     Subjective   "I was able to pull my luggage thru [place] airport with no problems and without the brace"      MD visit 2/1/2018     Objective   Edema. Measured in centimeters.    12/14/2017 12/14/2017 1/11/2018      Ring:    left  Right  Right      P1            6.8 7.0 6.6 (-0.4)      PIP            6.2 7.1  6.4 (-0.7)      P2             5.4 5.8  5.4 (-0.4)      Small:              P1           5.7 6.5 6.2 (-0.3 )        PIP        5.4 5.8  5.8      P2        5.0 5.0 5.0         Wrist ROM. Measured in degrees.  Date 12/14/2017 12/14/2017        Left Right      Wrist ext/flex 73/77 53/48            Hand ROM. Measured in degrees.  Date 12/14/2017 12/14/2017 1/11/18       Left Right Right      Ring:   MP 0/87 0/42 0/85                 PIP 0/108 8/48 0/97                 DIP 0/78 0/15  0/70                FRY   97  252                 Small:  MP 0/95 0/14 0/90                  PIP 0/94 10/30 8/93                 DIP 0/80 0/10  0/62                FRY   44  237            Strength (Dyanmometer) and Pinch Strength (Pinch Gauge)  Measured in pounds.  Date 1/11/2018          Right / left                   Rung II  R 45  / L 65          Rung III           Ulirch Pinch           3pt Pinch           2pt Pinch                 Functional Limitation Reporting   Tool: FOTO  Category: Carry, Move, Handle  Visit DATE SCORE Current  G-Code Goal  G-Code   Intake 12/14/2017 57/100 -EI -LJ   6TH  1/11/2018  61/100       10TH           Discharge      " "        Treatment        IPJ Blocks FDS Glides Wave Hook Straight Fist Composite Fist   RF x10  SF x10  x10 AROM x10 AROM x10 AROM x10 AROM     Lifts ADD/ABD Reverse  Blocks Isolated   EDM Isolated   EDC   x10 AROM x10 AROM Not today x10 AROM X 10 reps      Upgraded to green putty in clinic for gross , raking, reverse raking, and digit composite extension "donut" x 10 reps each.   Provided green putty for HEP.    Added 55 lbs. PHG for gross  x 10 reps, blue digit extender for EDC/EDM strengthening x 15 reps. And green digi flex x 12 reps for isolated/composite finger flexion , 4# wrist flex/ext, RD, UD, sup/pron 2x10 reps.   Reviewed HEP with patient to ensure proper performance of therex.     Charges   Start Time: 2:15   End Time: 3  Total Time: 45min  Moist Hot Pack - 05746    Fluidotherapy - 28230    Ultrasound - 12587    Paraffin - 40081     10 min    Therapeutic Exercise - 97110 X2, 35min   Therapeutic Activity - 68135    Manual Therapy - 03743      Assessment   Patient is a 41yo male with a right boxers fracture being managed conservatively. Edema, decreased endurance, limited ROM, and decreased strength are limiting functional use of R, dominant hand. Pt has little pain. Pt transitioning well to strengthening program.     Plan   Continue skilled occupational therapy 1-2x/week for 12 weeks from 12/14/2017 to 3/14/2017.    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  LTG #1: Pt will report a pain level of 0 out of 10 with using tools.       LTG #2: Pt will demo improved FOTO score to 80% or higher.    LTG #3: Pt will return to prior level of function for ADLs and household management.      Short Term Goals (STGs); to be met within 4 weeks (1/14/2018).  STG #1a: Pt will report 2 out of 10 pain level with bathing.--met   STG #2a: Pt will demo improved FOTO score by 15 points.   STG #3a: Pt will demonstrate " independence with issued HEP. MET  STG #3b: Pt will demo improved RF and SF FRY by 20 degrees each needed to aid with closing his hand around objects.---met     Adjustments/Upgrades to HEP Upgrade to green theraputty for HEP.

## 2018-01-18 ENCOUNTER — CLINICAL SUPPORT (OUTPATIENT)
Dept: REHABILITATION | Facility: HOSPITAL | Age: 43
End: 2018-01-18
Payer: COMMERCIAL

## 2018-01-18 DIAGNOSIS — R29.898 DECREASED GRIP STRENGTH OF RIGHT HAND: ICD-10-CM

## 2018-01-18 DIAGNOSIS — S62.339A CLOSED BOXER'S FRACTURE, INITIAL ENCOUNTER: Primary | ICD-10-CM

## 2018-01-18 DIAGNOSIS — M25.60 RANGE OF MOTION DEFICIT: ICD-10-CM

## 2018-01-18 PROCEDURE — 97110 THERAPEUTIC EXERCISES: CPT

## 2018-01-18 NOTE — PROGRESS NOTES
"Occupational Therapy Daily Note     Date: 1/18/2018  Name: Tonio Adams  YOB: 1975  Chart Number: [id number]  Referring Physician: Eneida Tafoya PA and Dr. Corbett  Diagnosis:   1. Closed boxer's fracture, initial encounter     2. Range of motion deficit     3. Decreased  strength of right hand        ICD 10: S62.339A  Involved Side: Right   Hand Dominance: Right  Date of Onset: 11/10/2017  Date of OT Evaluation: 12/14/2017    Visit #: 8  (FOTO, visit 1, 6)     Subjective   "It's a little stiff and slightly sore with this cold weather, I also caught myself slipping down the stairs"      MD visit 2/1/2018     Objective   Edema. Measured in centimeters.    12/14/2017 12/14/2017 1/11/2018      Ring:    left  Right  Right      P1            6.8 7.0 6.6 (-0.4)      PIP            6.2 7.1  6.4 (-0.7)      P2             5.4 5.8  5.4 (-0.4)      Small:              P1           5.7 6.5 6.2 (-0.3 )        PIP        5.4 5.8  5.8      P2        5.0 5.0 5.0         Wrist ROM. Measured in degrees.  Date 12/14/2017 12/14/2017        Left Right      Wrist ext/flex 73/77 53/48            Hand ROM. Measured in degrees.  Date 12/14/2017 12/14/2017 1/11/18       Left Right Right      Ring:   MP 0/87 0/42 0/85                 PIP 0/108 8/48 0/97                 DIP 0/78 0/15  0/70                FRY   97  252                 Small:  MP 0/95 0/14 0/90                  PIP 0/94 10/30 8/93                 DIP 0/80 0/10  0/62                FRY   44  237            Strength (Dyanmometer) and Pinch Strength (Pinch Gauge)  Measured in pounds.  Date 1/11/2018          Right / left                   Rung II  R 45  / L 65          Rung III           Ulrich Pinch           3pt Pinch           2pt Pinch                 Functional Limitation Reporting   Tool: FOTO  Category: Carry, Move, Handle  Visit DATE SCORE Current  G-Code Goal  G-Code   Intake 12/14/2017 57/100 -MU -HZ   6TH  1/11/2018  61/100       10TH   " "        Discharge              Treatment        IPJ Blocks FDS Glides Wave Hook Straight Fist Composite Fist   RF x10  SF x10  x10 AROM x10 AROM x10 AROM x10 AROM     Lifts ADD/ABD Reverse  Blocks Isolated   EDM Isolated   EDC   x10 AROM x10 AROM Not today x10 AROM X 10 reps      Upgraded to green putty in clinic for gross , raking, reverse raking, and digit composite extension "donut" 3 x 10 reps each.   Provided green putty for HEP.    Added 55 lbs. PHG for gross  10 x 10 reps, blue digit extender for EDC/EDM strengthening 3 x 15 reps. And green digi flex 3 x 10  reps for isolated/composite finger flexion , 4# wrist flex/ext, RD, UD, sup/pron 2x10 reps.   Reviewed HEP with patient to ensure proper performance of therex.     Charges   Start Time: 2:15   End Time: 3  Total Time: 45min  Moist Hot Pack - 02597    Fluidotherapy - 83391    Ultrasound - 34226    Paraffin - 92677     10 min    Therapeutic Exercise - 97110 X3, 40min   Therapeutic Activity - 81302    Manual Therapy - 65809      Assessment   Progressing well with strengthening.  Increased endurance noted as client able to increase repetitions with decreased fatigue.  Patient is making good progress toward goals.  He continues to demonstrate decreased endurance, decreased strength which  are limiting functional use of R, dominant hand. Pt has little pain. Pt transitioning well to strengthening program.     Plan   Continue skilled occupational therapy 1-2x/week for 12 weeks from 12/14/2017 to 3/14/2017.    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  LTG #1: Pt will report a pain level of 0 out of 10 with using tools.     --met   LTG #2: Pt will demo improved FOTO score to 80% or higher.    LTG #3: Pt will return to prior level of function for ADLs and household management.      Short Term Goals (STGs); to be met within 4 weeks (1/14/2018).  STG #1a: Pt " will report 2 out of 10 pain level with bathing.--met   STG #2a: Pt will demo improved FOTO score by 15 points.   STG #3a: Pt will demonstrate independence with issued HEP. MET  STG #3b: Pt will demo improved RF and SF FRY by 20 degrees each needed to aid with closing his hand around objects.---met     Adjustments/Upgrades to HEP Upgrade to green theraputty for HEP.

## 2018-01-23 ENCOUNTER — CLINICAL SUPPORT (OUTPATIENT)
Dept: REHABILITATION | Facility: HOSPITAL | Age: 43
End: 2018-01-23
Payer: COMMERCIAL

## 2018-01-23 PROCEDURE — 97110 THERAPEUTIC EXERCISES: CPT

## 2018-01-23 PROCEDURE — 97018 PARAFFIN BATH THERAPY: CPT

## 2018-01-23 PROCEDURE — 97140 MANUAL THERAPY 1/> REGIONS: CPT

## 2018-01-23 NOTE — PROGRESS NOTES
"Occupational Therapy Daily Note     Date: 1/23/2018  Name: Tonio Adams  YOB: 1975  Chart Number: [id number]  Referring Physician: Eneida Tafoya PA and Dr. Corbett      ICD 10: S62.339A  Involved Side: Right   Hand Dominance: Right  Date of Onset: 11/10/2017  Date of OT Evaluation: 12/14/2017    Visit #: 9  (FOTO, visit 1, 6)     Subjective   "I have no pain in the hand or finger"      MD visit 2/1/2018     Objective   Edema. Measured in centimeters.    12/14/2017 12/14/2017 1/11/2018      Ring:    left  Right  Right      P1            6.8 7.0 6.6 (-0.4)      PIP            6.2 7.1  6.4 (-0.7)      P2             5.4 5.8  5.4 (-0.4)      Small:              P1           5.7 6.5 6.2 (-0.3 )        PIP        5.4 5.8  5.8      P2        5.0 5.0 5.0         Wrist ROM. Measured in degrees.  Date 12/14/2017 12/14/2017        Left Right      Wrist ext/flex 73/77 53/48            Hand ROM. Measured in degrees.  Date 12/14/2017 12/14/2017 1/11/18       Left Right Right      Ring:   MP 0/87 0/42 0/85                 PIP 0/108 8/48 0/97                 DIP 0/78 0/15  0/70                FRY   97  252                 Small:  MP 0/95 0/14 0/90                  PIP 0/94 10/30 8/93                 DIP 0/80 0/10  0/62                FRY   44  237            Strength (Dyanmometer) and Pinch Strength (Pinch Gauge)  Measured in pounds.  Date 1/11/2018          Right / left                   Rung II  R 45  / L 65          Rung III           Ulrich Pinch           3pt Pinch           2pt Pinch                 Functional Limitation Reporting   Tool: FOTO  Category: Carry, Move, Handle  Visit DATE SCORE Current  G-Code Goal  G-Code   Intake 12/14/2017 57/100 -MV -YY   6TH  1/11/2018  61/100       10TH           Discharge              Treatment        IPJ Blocks FDS Glides Wave Hook Straight Fist Composite Fist   RF x10  SF x10  x10 AROM x10 AROM x10 AROM x10 AROM     Lifts ADD/ABD Reverse  Blocks Isolated " "  EDM Isolated   EDC   x10 AROM x10 AROM Not today x10 AROM X 10 reps      Upgraded to green putty in clinic for gross , raking, reverse raking, and digit composite extension "donut" 3 x 10 reps each.   Provided green putty for HEP.    Added 55 lbs. PHG for gross  10 x 10 reps, blue digit extender for EDC/EDM strengthening 3 x 15 reps. And green digi flex 3 x 10  reps for isolated/composite finger flexion , 4# wrist flex/ext, RD, UD, sup/pron 2x10 reps.   Reviewed HEP with patient to ensure proper performance of therex.     Charges   Start Time: 2:15   End Time: 3  Total Time: 45min  Moist Hot Pack - 44664    Fluidotherapy - 54150    Ultrasound - 75887    Paraffin - 58825     10 min    Therapeutic Exercise - 97110 X3, 40min   Therapeutic Activity - 62575    Manual Therapy - 36439      Assessment   Progressing extremely well with  Increased resistive program. Pt had difficulty performing exercises which elicited fatigue.  No pain reported in  strengthening or wrist therex. Patient on track to attain goals by next visit.  Discharge next session.     Plan   Continue skilled occupational therapy 1-2x/week for 12 weeks from 12/14/2017 to 3/14/2017.    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  LTG #1: Pt will report a pain level of 0 out of 10 with using tools.     --met   LTG #2: Pt will demo improved FOTO score to 80% or higher.    LTG #3: Pt will return to prior level of function for ADLs and household management.      Short Term Goals (STGs); to be met within 4 weeks (1/14/2018).  STG #1a: Pt will report 2 out of 10 pain level with bathing.--met   STG #2a: Pt will demo improved FOTO score by 15 points.   STG #3a: Pt will demonstrate independence with issued HEP. MET  STG #3b: Pt will demo improved RF and SF FRY by 20 degrees each needed to aid with closing his hand around objects.---met "     Adjustments/Upgrades to HEP Upgrade to green theraputty for HEP.

## 2018-01-25 ENCOUNTER — CLINICAL SUPPORT (OUTPATIENT)
Dept: REHABILITATION | Facility: HOSPITAL | Age: 43
End: 2018-01-25
Payer: COMMERCIAL

## 2018-01-25 PROCEDURE — 97110 THERAPEUTIC EXERCISES: CPT

## 2018-01-25 PROCEDURE — 97018 PARAFFIN BATH THERAPY: CPT

## 2018-01-25 NOTE — PROGRESS NOTES
"Occupational Therapy Daily Note     Date: 1/25/2018  Name: Tonio Adams  YOB: 1975  Chart Number: [id number]  Referring Physician: Eneida Tafoya PA and Dr. Corbett      ICD 10: S62.339A  Involved Side: Right   Hand Dominance: Right  Date of Onset: 11/10/2017  Date of OT Evaluation: 12/14/2017    Visit #: 9  (FOTO, visit 1, 6)     Subjective   " Its feeling great"      MD visit 2/1/2018     Objective   Edema. Measured in centimeters.    12/14/2017 12/14/2017 1/11/2018     Ring:    left  Right  Right     P1            6.8 7.0 6.6 (-0.4)      PIP            6.2 7.1  6.4 (-0.7)      P2             5.4 5.8  5.4 (-0.4)      Small:              P1           5.7 6.5 6.2 (-0.3 )        PIP        5.4 5.8  5.8      P2        5.0 5.0 5.0         Wrist ROM. Measured in degrees.  Date 12/14/2017 12/14/2017 1/25/18       Left Right Right     Wrist ext/flex 73/77 53/48  65/75          Hand ROM. Measured in degrees.  Date 12/14/2017 12/14/2017 1/11/18 12/11/19      Left Right Right   Right   Ring:   MP 0/87 0/42 0/85   0/87              PIP 0/108 8/48 0/97   0/105              DIP 0/78 0/15  0/70  0/72              FRY   97  252                 Small:  MP 0/95 0/14 0/90  0/95                PIP 0/94 10/30 8/93  0/110               DIP 0/80 0/10  0/62  0/65              FRY   44  237            Strength (Dyanmometer) and Pinch Strength (Pinch Gauge)  Measured in pounds.  Date 1/11/2018 1/25/16         Right / left                   Rung II  R 45  / L 65   R 65, R 95       Rung III           Ulrich Pinch           3pt Pinch           2pt Pinch                 Functional Limitation Reporting   Tool: FOTO  Category: Carry, Move, Handle  Visit DATE SCORE Current  G-Code Goal  G-Code   Intake 12/14/2017 57/100 -LS -NQ   6TH  1/11/2018  61/100       10TH           Discharge              Treatment        IPJ Blocks FDS Glides Wave Hook Straight Fist Composite Fist   RF x10  SF x10  x10 AROM x10 AROM x10 AROM " "x10 AROM     Lifts ADD/ABD Reverse  Blocks Isolated   EDM Isolated   EDC   x10 AROM x10 AROM Not today x10 AROM X 10 reps      Upgraded to green putty in clinic for gross , raking, reverse raking, and digit composite extension "donut" 3 x 10 reps each.   Provided green putty for HEP.    Added 55 lbs. PHG for gross  10 x 10 reps, blue digit extender for EDC/EDM strengthening 3 x 15 reps. And green digi flex 3 x 10  reps for isolated/composite finger flexion , 4# wrist flex/ext, RD, UD, sup/pron 2x10 reps.   Reviewed HEP with patient to ensure proper performance of therex.     Charges   Start Time: 2:15   End Time: 3  Total Time: 45min  Moist Hot Pack - 45569    Fluidotherapy - 60223    Ultrasound - 49191    Paraffin - 96068     10 min    Therapeutic Exercise - 97110 X3, 40min   Therapeutic Activity - 24171    Manual Therapy - 98282      Assessment   Pt seen for final therapy appointment. The patient presents with WNL for AROM in the fingers and wrist. Pt continues to have minimal deficits with wrist flexion and  strength which he will address independently with HEP. Therapy services are no longer needed at this time. The patient has returned to normal recreational weight lifting program without pain.     Plan   Continue skilled occupational therapy 1-2x/week for 12 weeks from 12/14/2017 to 3/14/2017.    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 -Met  LTG #1: Pt will report a pain level of 0 out of 10 with using tools.     --met   LTG #2: Pt will demo improved FOTO score to 80% or higher.  -met  LTG #3: Pt will return to prior level of function for ADLs and household management.      Short Term Goals (STGs); to be met within 4 weeks (1/14/2018).  STG #1a: Pt will report 2 out of 10 pain level with bathing.--met   STG #2a: Pt will demo improved FOTO score by 15 points. -met  STG #3a: Pt will demonstrate " independence with issued HEP. MET  STG #3b: Pt will demo improved RF and SF FRY by 20 degrees each needed to aid with closing his hand around objects.---met     Adjustments/Upgrades to HEP Upgrade to green theraputty for HEP.

## 2018-01-29 ENCOUNTER — LAB VISIT (OUTPATIENT)
Dept: LAB | Facility: OTHER | Age: 43
End: 2018-01-29
Attending: INTERNAL MEDICINE
Payer: COMMERCIAL

## 2018-01-29 ENCOUNTER — OFFICE VISIT (OUTPATIENT)
Dept: INTERNAL MEDICINE | Facility: CLINIC | Age: 43
End: 2018-01-29
Payer: COMMERCIAL

## 2018-01-29 VITALS
TEMPERATURE: 98 F | WEIGHT: 214.94 LBS | HEIGHT: 72 IN | OXYGEN SATURATION: 98 % | HEART RATE: 75 BPM | BODY MASS INDEX: 29.11 KG/M2 | SYSTOLIC BLOOD PRESSURE: 120 MMHG | DIASTOLIC BLOOD PRESSURE: 82 MMHG

## 2018-01-29 DIAGNOSIS — Z00.00 ENCOUNTER FOR WELLNESS EXAMINATION IN ADULT: ICD-10-CM

## 2018-01-29 DIAGNOSIS — Z23 NEED FOR DIPHTHERIA-TETANUS-PERTUSSIS (TDAP) VACCINE: ICD-10-CM

## 2018-01-29 DIAGNOSIS — F41.1 ANXIETY STATE: Primary | ICD-10-CM

## 2018-01-29 LAB
ALBUMIN SERPL BCP-MCNC: 4.5 G/DL
ALP SERPL-CCNC: 57 U/L
ALT SERPL W/O P-5'-P-CCNC: 35 U/L
ANION GAP SERPL CALC-SCNC: 10 MMOL/L
AST SERPL-CCNC: 19 U/L
BACTERIA #/AREA URNS HPF: ABNORMAL /HPF
BASOPHILS # BLD AUTO: 0.01 K/UL
BASOPHILS NFR BLD: 0.2 %
BILIRUB SERPL-MCNC: 0.8 MG/DL
BUN SERPL-MCNC: 11 MG/DL
CALCIUM SERPL-MCNC: 9.3 MG/DL
CAOX CRY URNS QL MICRO: ABNORMAL
CHLORIDE SERPL-SCNC: 106 MMOL/L
CHOLEST SERPL-MCNC: 178 MG/DL
CHOLEST/HDLC SERPL: 4.1 {RATIO}
CO2 SERPL-SCNC: 25 MMOL/L
CREAT SERPL-MCNC: 1 MG/DL
DIFFERENTIAL METHOD: ABNORMAL
EOSINOPHIL # BLD AUTO: 0.2 K/UL
EOSINOPHIL NFR BLD: 3.5 %
ERYTHROCYTE [DISTWIDTH] IN BLOOD BY AUTOMATED COUNT: 13.2 %
EST. GFR  (AFRICAN AMERICAN): >60 ML/MIN/1.73 M^2
EST. GFR  (NON AFRICAN AMERICAN): >60 ML/MIN/1.73 M^2
ESTIMATED AVG GLUCOSE: 85 MG/DL
GLUCOSE SERPL-MCNC: 86 MG/DL
HBA1C MFR BLD HPLC: 4.6 %
HCT VFR BLD AUTO: 45.5 %
HDLC SERPL-MCNC: 43 MG/DL
HDLC SERPL: 24.2 %
HGB BLD-MCNC: 15.3 G/DL
LDLC SERPL CALC-MCNC: 96.2 MG/DL
LYMPHOCYTES # BLD AUTO: 0.8 K/UL
LYMPHOCYTES NFR BLD: 18.2 %
MCH RBC QN AUTO: 31.7 PG
MCHC RBC AUTO-ENTMCNC: 33.6 G/DL
MCV RBC AUTO: 94 FL
MICROSCOPIC COMMENT: ABNORMAL
MONOCYTES # BLD AUTO: 0.2 K/UL
MONOCYTES NFR BLD: 4.3 %
NEUTROPHILS # BLD AUTO: 3.4 K/UL
NEUTROPHILS NFR BLD: 73.6 %
NONHDLC SERPL-MCNC: 135 MG/DL
PLATELET # BLD AUTO: 159 K/UL
PMV BLD AUTO: 10.3 FL
POTASSIUM SERPL-SCNC: 4.3 MMOL/L
PROT SERPL-MCNC: 7.1 G/DL
RBC # BLD AUTO: 4.83 M/UL
RBC #/AREA URNS HPF: 0 /HPF (ref 0–4)
SODIUM SERPL-SCNC: 141 MMOL/L
TRIGL SERPL-MCNC: 194 MG/DL
TSH SERPL DL<=0.005 MIU/L-ACNC: 2.22 UIU/ML
WBC # BLD AUTO: 4.62 K/UL
WBC #/AREA URNS HPF: 1 /HPF (ref 0–5)

## 2018-01-29 PROCEDURE — 81000 URINALYSIS NONAUTO W/SCOPE: CPT

## 2018-01-29 PROCEDURE — 86803 HEPATITIS C AB TEST: CPT

## 2018-01-29 PROCEDURE — 86706 HEP B SURFACE ANTIBODY: CPT

## 2018-01-29 PROCEDURE — 99999 PR PBB SHADOW E&M-EST. PATIENT-LVL IV: CPT | Mod: PBBFAC,,, | Performed by: INTERNAL MEDICINE

## 2018-01-29 PROCEDURE — 36415 COLL VENOUS BLD VENIPUNCTURE: CPT

## 2018-01-29 PROCEDURE — 99203 OFFICE O/P NEW LOW 30 MIN: CPT | Mod: S$GLB,,, | Performed by: INTERNAL MEDICINE

## 2018-01-29 PROCEDURE — 84443 ASSAY THYROID STIM HORMONE: CPT

## 2018-01-29 PROCEDURE — 85025 COMPLETE CBC W/AUTO DIFF WBC: CPT

## 2018-01-29 PROCEDURE — 80053 COMPREHEN METABOLIC PANEL: CPT

## 2018-01-29 PROCEDURE — 83036 HEMOGLOBIN GLYCOSYLATED A1C: CPT

## 2018-01-29 PROCEDURE — 86703 HIV-1/HIV-2 1 RESULT ANTBDY: CPT

## 2018-01-29 PROCEDURE — 80061 LIPID PANEL: CPT

## 2018-01-29 PROCEDURE — 87491 CHLMYD TRACH DNA AMP PROBE: CPT

## 2018-01-29 NOTE — PATIENT INSTRUCTIONS
Your blood pressure was good today.  Your exam today was unremarkable.  I agree with weight reduction with diet and exercise.  We will check routine labs today, which will include screening for diabetes, thyroid disease, and STDs (syphilis, gonorrhea and chlamydia, HIV).  We will also check hepatits C and B antibodies.  Please consider getting your flu vaccine.  Return in 1 year for routine follow up - sooner if needed.

## 2018-01-29 NOTE — PROGRESS NOTES
Subjective:       Patient ID: Tonio Adams is a 42 y.o. male with a PMH significant for Generalized Anxiety Disorder, Right Hand Fracture (11/2017) who presents today to establish care.    Chief Complaint: Establish Care and Annual Exam    HPI Patient is overall doing well and without signficant complaints.  He is here to establish care given a work colleague who was diagnosed with colon cancer at age 54.  Patient denies f/c, n/v/d.  No chest pain or SOB.  No abdominal pain or dysuria.  No headaches or change in vision.  No dizziness.  No significant unintentional weight gain (he knows he needs to lose about 20 pounds) or weight loss.  Remaining ROS negative.Patient denies f/c, n/v/d.  No chest pain or SOB.  No abdominal pain or dysuria.  No headaches or change in vision.  No dizziness.  No significant  weight gain or weight loss.  Remaining ROS negative.    Review of Systems   Constitutional: Positive for appetite change (weight gain). Negative for diaphoresis, fatigue and unexpected weight change.   HENT: Negative for congestion, ear pain, hearing loss, rhinorrhea, sinus pressure, sore throat, trouble swallowing and voice change.    Eyes: Negative for photophobia, pain and visual disturbance.   Respiratory: Negative for cough, chest tightness, shortness of breath and wheezing.    Cardiovascular: Negative for chest pain, palpitations and leg swelling.   Gastrointestinal: Negative for abdominal pain, blood in stool, constipation, diarrhea, nausea and vomiting.   Endocrine: Negative for cold intolerance, heat intolerance, polydipsia, polyphagia and polyuria.   Genitourinary: Negative for decreased urine volume, difficulty urinating, discharge, dysuria, flank pain, hematuria, scrotal swelling, testicular pain and urgency.   Musculoskeletal: Negative for arthralgias, back pain, myalgias and neck pain.   Skin: Negative for rash.   Neurological: Negative for dizziness, tremors, syncope, weakness, numbness and headaches.    Hematological: Does not bruise/bleed easily.   Psychiatric/Behavioral: Negative for agitation, confusion and sleep disturbance. The patient is not nervous/anxious.        Objective:      Physical Exam   Constitutional: He is oriented to person, place, and time. He appears well-developed and well-nourished.   HENT:   Head: Normocephalic.   Right Ear: External ear normal.   Left Ear: External ear normal.   Nose: Nose normal.   Mouth/Throat: Oropharynx is clear and moist.   Eyes: Conjunctivae and EOM are normal. Pupils are equal, round, and reactive to light. Right eye exhibits no discharge. Left eye exhibits no discharge. No scleral icterus.   Neck: Normal range of motion. Neck supple. No thyromegaly present.   Cardiovascular: Normal rate, regular rhythm, normal heart sounds and intact distal pulses.  Exam reveals no gallop and no friction rub.    No murmur heard.  Pulmonary/Chest: Effort normal and breath sounds normal. No respiratory distress. He has no wheezes. He has no rales.   Abdominal: Soft. Bowel sounds are normal. He exhibits no distension. There is no tenderness. There is no rebound and no guarding.   Genitourinary: Rectum normal and penis normal. No penile tenderness.   Musculoskeletal: He exhibits no edema.   Lymphadenopathy:     He has no cervical adenopathy.   Neurological: He is alert and oriented to person, place, and time. No cranial nerve deficit or sensory deficit.   Skin: Skin is warm and dry. No rash noted. No erythema.   Psychiatric: He has a normal mood and affect. His behavior is normal. Thought content normal.       Assessment:       1. Anxiety state    2. Encounter for wellness examination in adult    3. Need for diphtheria-tetanus-pertussis (Tdap) vaccine        Plan:    -Psych - Anxiety - no currently taking anxiolytics and stable.  -Weight - BMI high - he agrees to continue with diet and exercise to reduce BMI.  -HCM - discussed Flu Vaccine (declined), Tdap (done in 8/2017).  Routine  labs including Lipids and TSH.  STD screen, including HIV, RPR, GC/Chlamydia.  HCV and Cher BsAb (patient had HepB vaccine and wishes to see if he has antibodies).  There is no indication for colonoscopy given no 1st degree relatives and no GI symptoms.  Will send home with fitkit today.

## 2018-01-30 ENCOUNTER — TELEPHONE (OUTPATIENT)
Dept: ADMINISTRATIVE | Facility: HOSPITAL | Age: 43
End: 2018-01-30

## 2018-01-30 LAB
C TRACH DNA SPEC QL NAA+PROBE: NOT DETECTED
HBV SURFACE AB SER-ACNC: NEGATIVE M[IU]/ML
HCV AB SERPL QL IA: NEGATIVE
HIV 1+2 AB+HIV1 P24 AG SERPL QL IA: NEGATIVE
N GONORRHOEA DNA SPEC QL NAA+PROBE: NOT DETECTED

## 2018-01-30 NOTE — TELEPHONE ENCOUNTER
Spoke with pt. Advised of lab results per Dr. Bean. Pt. verbalized understanding. Will mail pt. a copy of the results.

## 2018-01-30 NOTE — TELEPHONE ENCOUNTER
----- Message from Lety Joiner sent at 1/30/2018 12:04 PM CST -----  Contact: pt  _x  1st Request  _  2nd Request  _  3rd Request      Who:pt    Why:calling in regards to his lab results     What Number to Call Back: [phone]    When to Expect a call back: (Before the end of the day)   -- if call after 3:00 call back will be tomorrow.

## 2018-02-01 ENCOUNTER — DOCUMENTATION ONLY (OUTPATIENT)
Dept: ORTHOPEDICS | Facility: CLINIC | Age: 43
End: 2018-02-01

## 2018-02-01 ENCOUNTER — TELEPHONE (OUTPATIENT)
Dept: ORTHOPEDICS | Facility: CLINIC | Age: 43
End: 2018-02-01

## 2018-02-01 ENCOUNTER — HOSPITAL ENCOUNTER (OUTPATIENT)
Dept: RADIOLOGY | Facility: OTHER | Age: 43
Discharge: HOME | End: 2018-02-01
Attending: PHYSICIAN ASSISTANT
Payer: COMMERCIAL

## 2018-02-01 DIAGNOSIS — S62.339A CLOSED BOXER'S FRACTURE, INITIAL ENCOUNTER: Chronic | ICD-10-CM

## 2018-02-01 PROCEDURE — 73130 X-RAY EXAM OF HAND: CPT | Mod: 26,RT,, | Performed by: RADIOLOGY

## 2018-02-01 PROCEDURE — 73130 X-RAY EXAM OF HAND: CPT | Mod: TC,FY,RT

## 2018-02-01 NOTE — TELEPHONE ENCOUNTER
----- Message from MAR Perkins sent at 2/1/2018 12:36 PM CST -----  Contact: pt   Progressive healing of the fracture, thanks!  ----- Message -----  From: Shirley Meneses MA  Sent: 2/1/2018  11:29 AM  To: MAR Perkins        ----- Message -----  From: Mayra Dobbins  Sent: 2/1/2018  11:11 AM  To: Michelet Jasmine Staff    x 1st Request  _ 2nd Request  _ 3rd Request    Who:pt     Why:pt is calling to get his x-ray results, stated he couldn't wait for his appt today and had to leave.      What Number to Call Back:[phone]     When to Expect a call back: (Before the end of the day)  -- if call after 3:00 call back will be tomorrow.

## 2018-02-01 NOTE — PROGRESS NOTES
Mr Elizalde was here today for an appointment but realized he was moving out of town today and decided to leave without seeing the Physician Assistant.  Physician Assistant was informed.

## 2018-04-30 PROBLEM — Z00.00 ENCOUNTER FOR WELLNESS EXAMINATION IN ADULT: Status: RESOLVED | Noted: 2018-01-29 | Resolved: 2018-04-30

## 2020-02-18 ENCOUNTER — TELEPHONE (OUTPATIENT)
Dept: INTERNAL MEDICINE | Facility: CLINIC | Age: 45
End: 2020-02-18

## 2020-05-06 ENCOUNTER — PATIENT OUTREACH (OUTPATIENT)
Dept: ADMINISTRATIVE | Facility: HOSPITAL | Age: 45
End: 2020-05-06

## 2021-09-06 NOTE — TELEPHONE ENCOUNTER
Left message for patient to call the office back to re-establish care with a new PCP  
Breath sounds clear and equal bilaterally.

## 2023-09-28 ENCOUNTER — APPOINTMENT (RX ONLY)
Dept: URBAN - METROPOLITAN AREA CLINIC 86 | Facility: CLINIC | Age: 48
Setting detail: DERMATOLOGY
End: 2023-09-28

## 2023-09-28 VITALS — HEIGHT: 63 IN | WEIGHT: 195 LBS

## 2023-09-28 DIAGNOSIS — D22 MELANOCYTIC NEVI: ICD-10-CM

## 2023-09-28 DIAGNOSIS — L57.8 OTHER SKIN CHANGES DUE TO CHRONIC EXPOSURE TO NONIONIZING RADIATION: ICD-10-CM

## 2023-09-28 DIAGNOSIS — D485 NEOPLASM OF UNCERTAIN BEHAVIOR OF SKIN: ICD-10-CM | Status: INADEQUATELY CONTROLLED

## 2023-09-28 PROBLEM — D48.5 NEOPLASM OF UNCERTAIN BEHAVIOR OF SKIN: Status: ACTIVE | Noted: 2023-09-28

## 2023-09-28 PROBLEM — D22.5 MELANOCYTIC NEVI OF TRUNK: Status: ACTIVE | Noted: 2023-09-28

## 2023-09-28 PROCEDURE — ? PHOTO-DOCUMENTATION

## 2023-09-28 PROCEDURE — ? COUNSELING

## 2023-09-28 PROCEDURE — 11102 TANGNTL BX SKIN SINGLE LES: CPT

## 2023-09-28 PROCEDURE — ? BIOPSY BY SHAVE METHOD

## 2023-09-28 PROCEDURE — 99202 OFFICE O/P NEW SF 15 MIN: CPT | Mod: 25

## 2023-09-28 ASSESSMENT — LOCATION DETAILED DESCRIPTION DERM
LOCATION DETAILED: LEFT PROXIMAL DORSAL FOREARM
LOCATION DETAILED: RIGHT MEDIAL UPPER BACK
LOCATION DETAILED: RIGHT PROXIMAL DORSAL FOREARM
LOCATION DETAILED: RIGHT POSTERIOR SHOULDER
LOCATION DETAILED: LEFT INFERIOR CENTRAL MALAR CHEEK

## 2023-09-28 ASSESSMENT — LOCATION ZONE DERM
LOCATION ZONE: TRUNK
LOCATION ZONE: FACE
LOCATION ZONE: ARM

## 2023-09-28 ASSESSMENT — LOCATION SIMPLE DESCRIPTION DERM
LOCATION SIMPLE: LEFT FOREARM
LOCATION SIMPLE: LEFT CHEEK
LOCATION SIMPLE: RIGHT FOREARM
LOCATION SIMPLE: RIGHT SHOULDER
LOCATION SIMPLE: RIGHT UPPER BACK

## 2023-09-28 ASSESSMENT — PAIN INTENSITY VAS: HOW INTENSE IS YOUR PAIN 0 BEING NO PAIN, 10 BEING THE MOST SEVERE PAIN POSSIBLE?: NO PAIN

## 2023-09-28 NOTE — PROCEDURE: BIOPSY BY SHAVE METHOD
Detail Level: Detailed
Depth Of Biopsy: dermis
Was A Bandage Applied: Yes
Size Of Lesion In Cm: 0.8
X Size Of Lesion In Cm: 0
Biopsy Type: H and E
Biopsy Method: Dermablade
Anesthesia Type: 2% lidocaine without epinephrine
Anesthesia Volume In Cc (Will Not Render If 0): 1
Hemostasis: Electrocautery
Wound Care: Petrolatum
Dressing: bandage
Destruction After The Procedure: No
Type Of Destruction Used: Curettage
Curettage Text: The wound bed was treated with curettage after the biopsy was performed.
Cryotherapy Text: The wound bed was treated with cryotherapy after the biopsy was performed.
Electrodesiccation Text: The wound bed was treated with electrodesiccation after the biopsy was performed.
Electrodesiccation And Curettage Text: The wound bed was treated with electrodesiccation and curettage after the biopsy was performed.
Silver Nitrate Text: The wound bed was treated with silver nitrate after the biopsy was performed.
Lab: [id number]
Lab Facility: [id number]
Consent: Written consent was obtained and risks were reviewed including but not limited to scarring, infection, bleeding, scabbing, incomplete removal, nerve damage and allergy to anesthesia.
Post-Care Instructions: I reviewed with the patient in detail post-care instructions. Patient is to keep the biopsy site dry overnight, and then apply bacitracin twice daily until healed. Patient may apply hydrogen peroxide soaks to remove any crusting.
Notification Instructions: Patient will be notified of biopsy results. However, patient instructed to call the office if not contacted within 2 weeks.
Billing Type: Third-Party Bill
Information: Selecting Yes will display possible errors in your note based on the variables you have selected. This validation is only offered as a suggestion for you. PLEASE NOTE THAT THE VALIDATION TEXT WILL BE REMOVED WHEN YOU FINALIZE YOUR NOTE. IF YOU WANT TO FAX A PRELIMINARY NOTE YOU WILL NEED TO TOGGLE THIS TO 'NO' IF YOU DO NOT WANT IT IN YOUR FAXED NOTE.